# Patient Record
Sex: MALE | Race: ASIAN | NOT HISPANIC OR LATINO
[De-identification: names, ages, dates, MRNs, and addresses within clinical notes are randomized per-mention and may not be internally consistent; named-entity substitution may affect disease eponyms.]

---

## 2018-12-19 PROBLEM — Z00.00 ENCOUNTER FOR PREVENTIVE HEALTH EXAMINATION: Status: ACTIVE | Noted: 2018-12-19

## 2018-12-26 ENCOUNTER — APPOINTMENT (OUTPATIENT)
Dept: INTERNAL MEDICINE | Facility: CLINIC | Age: 78
End: 2018-12-26

## 2018-12-31 ENCOUNTER — APPOINTMENT (OUTPATIENT)
Dept: INTERNAL MEDICINE | Facility: CLINIC | Age: 78
End: 2018-12-31
Payer: MEDICARE

## 2018-12-31 VITALS
OXYGEN SATURATION: 100 % | HEART RATE: 61 BPM | DIASTOLIC BLOOD PRESSURE: 69 MMHG | HEIGHT: 67 IN | SYSTOLIC BLOOD PRESSURE: 129 MMHG | BODY MASS INDEX: 21.35 KG/M2 | WEIGHT: 136 LBS

## 2018-12-31 DIAGNOSIS — Z83.3 FAMILY HISTORY OF DIABETES MELLITUS: ICD-10-CM

## 2018-12-31 DIAGNOSIS — Z78.9 OTHER SPECIFIED HEALTH STATUS: ICD-10-CM

## 2018-12-31 DIAGNOSIS — Z82.49 FAMILY HISTORY OF ISCHEMIC HEART DISEASE AND OTHER DISEASES OF THE CIRCULATORY SYSTEM: ICD-10-CM

## 2018-12-31 PROCEDURE — G0008: CPT

## 2018-12-31 PROCEDURE — 99213 OFFICE O/P EST LOW 20 MIN: CPT | Mod: 25

## 2018-12-31 PROCEDURE — 90662 IIV NO PRSV INCREASED AG IM: CPT

## 2018-12-31 RX ORDER — ASPIRIN 81 MG
81 TABLET, DELAYED RELEASE (ENTERIC COATED) ORAL
Refills: 0 | Status: ACTIVE | COMMUNITY

## 2018-12-31 RX ORDER — AMLODIPINE BESYLATE 5 MG/1
5 TABLET ORAL
Refills: 0 | Status: ACTIVE | COMMUNITY

## 2018-12-31 NOTE — ASSESSMENT
[FreeTextEntry1] : Patient's blood pressure is well-controlled at 115/70 on current regimen. Patient is to continue current therapy. Influenza vaccine high-dose 0.5 cc IM has been given. Patient is advised to return for fasting labs.

## 2018-12-31 NOTE — HISTORY OF PRESENT ILLNESS
[FreeTextEntry1] : Routine followup patient with history of ASHD. [de-identified] : Patient with history of MI and cardiac stent in 1995. Otherwise has been well. He has a history of hypertension on amlodipine 5 and Toprol 50. He's been feeling well without chronic complaints. He wants a flu shot today.

## 2019-01-01 ENCOUNTER — TRANSCRIPTION ENCOUNTER (OUTPATIENT)
Age: 79
End: 2019-01-01

## 2019-01-14 ENCOUNTER — MEDICATION RENEWAL (OUTPATIENT)
Age: 79
End: 2019-01-14

## 2019-02-18 ENCOUNTER — MEDICATION RENEWAL (OUTPATIENT)
Age: 79
End: 2019-02-18

## 2019-04-11 ENCOUNTER — LABORATORY RESULT (OUTPATIENT)
Age: 79
End: 2019-04-11

## 2019-04-11 ENCOUNTER — APPOINTMENT (OUTPATIENT)
Dept: INTERNAL MEDICINE | Facility: CLINIC | Age: 79
End: 2019-04-11
Payer: MEDICARE

## 2019-04-11 DIAGNOSIS — N40.0 BENIGN PROSTATIC HYPERPLASIA WITHOUT LOWER URINARY TRACT SYMPMS: ICD-10-CM

## 2019-04-11 PROCEDURE — 36415 COLL VENOUS BLD VENIPUNCTURE: CPT

## 2019-04-12 LAB
25(OH)D3 SERPL-MCNC: 22.1 NG/ML
ALBUMIN SERPL ELPH-MCNC: 4.5 G/DL
ALP BLD-CCNC: 80 U/L
ALT SERPL-CCNC: 22 U/L
ANION GAP SERPL CALC-SCNC: 15 MMOL/L
AST SERPL-CCNC: 31 U/L
BASOPHILS # BLD AUTO: 0.06 K/UL
BASOPHILS NFR BLD AUTO: 1 %
BILIRUB SERPL-MCNC: 0.2 MG/DL
BUN SERPL-MCNC: 17 MG/DL
CALCIUM SERPL-MCNC: 10 MG/DL
CHLORIDE SERPL-SCNC: 103 MMOL/L
CHOLEST SERPL-MCNC: 134 MG/DL
CHOLEST/HDLC SERPL: 2.9 RATIO
CO2 SERPL-SCNC: 27 MMOL/L
CREAT SERPL-MCNC: 1.82 MG/DL
EOSINOPHIL # BLD AUTO: 0.23 K/UL
EOSINOPHIL NFR BLD AUTO: 4 %
ESTIMATED AVERAGE GLUCOSE: 143 MG/DL
GLUCOSE SERPL-MCNC: 116 MG/DL
HBA1C MFR BLD HPLC: 6.6 %
HCT VFR BLD CALC: 40.8 %
HDLC SERPL-MCNC: 46 MG/DL
HGB BLD-MCNC: 12.7 G/DL
IMM GRANULOCYTES NFR BLD AUTO: 0.2 %
LDLC SERPL CALC-MCNC: 60 MG/DL
LYMPHOCYTES # BLD AUTO: 1.29 K/UL
LYMPHOCYTES NFR BLD AUTO: 22.2 %
MAN DIFF?: NORMAL
MCHC RBC-ENTMCNC: 28.5 PG
MCHC RBC-ENTMCNC: 31.1 GM/DL
MCV RBC AUTO: 91.7 FL
MONOCYTES # BLD AUTO: 0.58 K/UL
MONOCYTES NFR BLD AUTO: 10 %
NEUTROPHILS # BLD AUTO: 3.64 K/UL
NEUTROPHILS NFR BLD AUTO: 62.6 %
PLATELET # BLD AUTO: 240 K/UL
POTASSIUM SERPL-SCNC: 5.1 MMOL/L
PROT SERPL-MCNC: 7.8 G/DL
PSA SERPL-MCNC: 1.52 NG/ML
RBC # BLD: 4.45 M/UL
RBC # FLD: 12.9 %
SODIUM SERPL-SCNC: 145 MMOL/L
TRIGL SERPL-MCNC: 138 MG/DL
TSH SERPL-ACNC: 9.66 UIU/ML
WBC # FLD AUTO: 5.81 K/UL

## 2019-04-17 ENCOUNTER — MEDICATION RENEWAL (OUTPATIENT)
Age: 79
End: 2019-04-17

## 2019-04-17 ENCOUNTER — NON-APPOINTMENT (OUTPATIENT)
Age: 79
End: 2019-04-17

## 2019-04-17 ENCOUNTER — APPOINTMENT (OUTPATIENT)
Dept: INTERNAL MEDICINE | Facility: CLINIC | Age: 79
End: 2019-04-17
Payer: MEDICARE

## 2019-04-17 VITALS
OXYGEN SATURATION: 98 % | BODY MASS INDEX: 21.35 KG/M2 | SYSTOLIC BLOOD PRESSURE: 130 MMHG | HEART RATE: 58 BPM | DIASTOLIC BLOOD PRESSURE: 70 MMHG | HEIGHT: 67 IN | WEIGHT: 136 LBS

## 2019-04-17 DIAGNOSIS — E11.9 TYPE 2 DIABETES MELLITUS W/OUT COMPLICATIONS: ICD-10-CM

## 2019-04-17 DIAGNOSIS — E78.5 HYPERLIPIDEMIA, UNSPECIFIED: ICD-10-CM

## 2019-04-17 DIAGNOSIS — E11.49 TYPE 2 DIABETES MELLITUS WITH OTHER DIABETIC NEUROLOGICAL COMPLICATION: ICD-10-CM

## 2019-04-17 PROCEDURE — 99214 OFFICE O/P EST MOD 30 MIN: CPT | Mod: 25

## 2019-04-17 PROCEDURE — 93000 ELECTROCARDIOGRAM COMPLETE: CPT

## 2019-04-17 NOTE — HISTORY OF PRESENT ILLNESS
[FreeTextEntry1] : Routine physical and 78-year-old male [de-identified] : This is a 78-year-old male with history of myocardial infarction and stent placement in 1995, history of hypertension on amlodipine 5 mg, simvastatin 20 mg and Lopressor 50 b.i.d. He feels well without chronic complaints. He is known to have chronic kidney disease with a creatinine of 1.64 in July of 2017. He is known to have a mildly elevated hemoglobin A1c. His current labs were reviewed reveal a hemoglobin A1c of 6.6, BUN 17 creatinine 1.82. Again the patient feels well no chest pain or shortness of breath. He does eat a lot of carbohydrates.

## 2019-04-17 NOTE — ASSESSMENT
[FreeTextEntry1] : Current blood pressure is 130/70. Patient has an elevated hemoglobin A1c of 6.6. Patient is advised to carefully monitor his diet. He refuses to take blood sugar medication at this time. Patient is also advised of chronic kidney disease he will be seeing his nephrologist shortly. We'll check a urine microalbumin level. EKG reveals an old anteroseptal wall myocardial infarction.

## 2019-04-18 LAB
CREAT SPEC-SCNC: 56 MG/DL
MICROALBUMIN 24H UR DL<=1MG/L-MCNC: 16.6 MG/DL
MICROALBUMIN/CREAT 24H UR-RTO: 294 MG/G

## 2019-09-24 ENCOUNTER — MEDICATION RENEWAL (OUTPATIENT)
Age: 79
End: 2019-09-24

## 2019-12-03 ENCOUNTER — RX RENEWAL (OUTPATIENT)
Age: 79
End: 2019-12-03

## 2020-02-07 ENCOUNTER — RX RENEWAL (OUTPATIENT)
Age: 80
End: 2020-02-07

## 2020-06-12 ENCOUNTER — RX RENEWAL (OUTPATIENT)
Age: 80
End: 2020-06-12

## 2021-10-22 ENCOUNTER — LABORATORY RESULT (OUTPATIENT)
Age: 81
End: 2021-10-22

## 2021-10-22 ENCOUNTER — APPOINTMENT (OUTPATIENT)
Dept: INTERNAL MEDICINE | Facility: CLINIC | Age: 81
End: 2021-10-22
Payer: MEDICARE

## 2021-10-22 VITALS
TEMPERATURE: 96.9 F | HEIGHT: 67 IN | OXYGEN SATURATION: 98 % | BODY MASS INDEX: 20.88 KG/M2 | WEIGHT: 133 LBS | SYSTOLIC BLOOD PRESSURE: 130 MMHG | DIASTOLIC BLOOD PRESSURE: 70 MMHG | HEART RATE: 57 BPM

## 2021-10-22 DIAGNOSIS — I10 ESSENTIAL (PRIMARY) HYPERTENSION: ICD-10-CM

## 2021-10-22 DIAGNOSIS — Z23 ENCOUNTER FOR IMMUNIZATION: ICD-10-CM

## 2021-10-22 DIAGNOSIS — I25.10 ATHEROSCLEROTIC HEART DISEASE OF NATIVE CORONARY ARTERY W/OUT ANGINA PECTORIS: ICD-10-CM

## 2021-10-22 DIAGNOSIS — H81.10 BENIGN PAROXYSMAL VERTIGO, UNSPECIFIED EAR: ICD-10-CM

## 2021-10-22 PROCEDURE — 99213 OFFICE O/P EST LOW 20 MIN: CPT | Mod: 25

## 2021-10-22 PROCEDURE — 90662 IIV NO PRSV INCREASED AG IM: CPT

## 2021-10-22 PROCEDURE — 36415 COLL VENOUS BLD VENIPUNCTURE: CPT

## 2021-10-22 PROCEDURE — G0008: CPT

## 2021-10-22 NOTE — ASSESSMENT
[FreeTextEntry1] : Current blood pressure 150/80 with a pulse of 60 and regular. I suspect vertigo this morning as to the benign positional vertigo. Patient is advised to observe symptoms. He is advised to take amlodipine 5 mg every day as well as increase Lopressor to 50 mg b.i.d. We'll check routine labs. Patient is advised to call me on Monday.

## 2021-10-22 NOTE — HISTORY OF PRESENT ILLNESS
[FreeTextEntry1] :  dizziness this morning with elevated blood pressure. [de-identified] : Patient with history of myocardial infarction with stent placement in 1995. He has a history of hypertension, mild diabetes and hyperlipidemia. He has not seen a doctor since I last saw him 2-1/2 years ago. He was feeling well only taking amlodipine 5 mg 2 times per week. He continues to take Lopressor 50 mg daily, simvastatin and baby aspirin. He was feeling well up until this morning when he awoke and got out of bed. He felt dizzy with spinning feeling. He took his blood pressure was 170/90. He became anxious. He now feels better with no further vertigo. He again has not been taking amlodipine regularly. He has not been taking Lopressor b.i.d. as recommended. He has not had medical followup in 2-1/2 years.

## 2021-10-22 NOTE — PHYSICAL EXAM
[No Acute Distress] : no acute distress [Well Nourished] : well nourished [Well Developed] : well developed [Well-Appearing] : well-appearing [Normal Sclera/Conjunctiva] : normal sclera/conjunctiva [PERRL] : pupils equal round and reactive to light [EOMI] : extraocular movements intact [Normal Outer Ear/Nose] : the outer ears and nose were normal in appearance [Normal Oropharynx] : the oropharynx was normal [No JVD] : no jugular venous distention [No Lymphadenopathy] : no lymphadenopathy [Supple] : supple [Thyroid Normal, No Nodules] : the thyroid was normal and there were no nodules present [No Respiratory Distress] : no respiratory distress  [No Accessory Muscle Use] : no accessory muscle use [Clear to Auscultation] : lungs were clear to auscultation bilaterally [Normal Rate] : normal rate  [Regular Rhythm] : with a regular rhythm [Normal S1, S2] : normal S1 and S2 [No Murmur] : no murmur heard [No Carotid Bruits] : no carotid bruits [No Abdominal Bruit] : a ~M bruit was not heard ~T in the abdomen [No Varicosities] : no varicosities [Pedal Pulses Present] : the pedal pulses are present [No Palpable Aorta] : no palpable aorta [No Extremity Clubbing/Cyanosis] : no extremity clubbing/cyanosis [Soft] : abdomen soft [Non Tender] : non-tender [Non-distended] : non-distended [No Masses] : no abdominal mass palpated [No HSM] : no HSM [Normal Bowel Sounds] : normal bowel sounds [Normal Posterior Cervical Nodes] : no posterior cervical lymphadenopathy [Normal Anterior Cervical Nodes] : no anterior cervical lymphadenopathy [No CVA Tenderness] : no CVA  tenderness [No Spinal Tenderness] : no spinal tenderness [No Joint Swelling] : no joint swelling [Grossly Normal Strength/Tone] : grossly normal strength/tone [No Rash] : no rash [Coordination Grossly Intact] : coordination grossly intact [No Focal Deficits] : no focal deficits [Normal Gait] : normal gait [Normal Affect] : the affect was normal [Normal Insight/Judgement] : insight and judgment were intact [de-identified] : 1+ ankle edema

## 2021-10-28 LAB
25(OH)D3 SERPL-MCNC: 28.1 NG/ML
ALBUMIN SERPL ELPH-MCNC: 4.2 G/DL
ALP BLD-CCNC: 112 U/L
ALT SERPL-CCNC: 16 U/L
ANION GAP SERPL CALC-SCNC: 13 MMOL/L
AST SERPL-CCNC: 21 U/L
BASOPHILS # BLD AUTO: 0.05 K/UL
BASOPHILS NFR BLD AUTO: 0.8 %
BILIRUB SERPL-MCNC: 0.2 MG/DL
BUN SERPL-MCNC: 16 MG/DL
CALCIUM SERPL-MCNC: 9 MG/DL
CHLORIDE SERPL-SCNC: 102 MMOL/L
CHOLEST SERPL-MCNC: 131 MG/DL
CO2 SERPL-SCNC: 22 MMOL/L
CREAT SERPL-MCNC: 1.44 MG/DL
EOSINOPHIL # BLD AUTO: 0.08 K/UL
EOSINOPHIL NFR BLD AUTO: 1.3 %
ESTIMATED AVERAGE GLUCOSE: 137 MG/DL
GLUCOSE SERPL-MCNC: 176 MG/DL
HBA1C MFR BLD HPLC: 6.4 %
HCT VFR BLD CALC: 42.4 %
HDLC SERPL-MCNC: 40 MG/DL
HGB BLD-MCNC: 13.4 G/DL
IMM GRANULOCYTES NFR BLD AUTO: 0.3 %
LDLC SERPL CALC-MCNC: 59 MG/DL
LYMPHOCYTES # BLD AUTO: 0.96 K/UL
LYMPHOCYTES NFR BLD AUTO: 15.5 %
MAN DIFF?: NORMAL
MCHC RBC-ENTMCNC: 27.9 PG
MCHC RBC-ENTMCNC: 31.6 GM/DL
MCV RBC AUTO: 88.1 FL
MONOCYTES # BLD AUTO: 0.43 K/UL
MONOCYTES NFR BLD AUTO: 7 %
NEUTROPHILS # BLD AUTO: 4.64 K/UL
NEUTROPHILS NFR BLD AUTO: 75.1 %
NONHDLC SERPL-MCNC: 90 MG/DL
PLATELET # BLD AUTO: 275 K/UL
POTASSIUM SERPL-SCNC: 5 MMOL/L
PROT SERPL-MCNC: 7.3 G/DL
PSA SERPL-MCNC: 1.05 NG/ML
RBC # BLD: 4.81 M/UL
RBC # FLD: 13.4 %
SODIUM SERPL-SCNC: 137 MMOL/L
TRIGL SERPL-MCNC: 155 MG/DL
TSH SERPL-ACNC: 4.3 UIU/ML
WBC # FLD AUTO: 6.18 K/UL

## 2021-11-12 ENCOUNTER — MED ADMIN CHARGE (OUTPATIENT)
Age: 81
End: 2021-11-12

## 2022-01-09 ENCOUNTER — RESULT CHARGE (OUTPATIENT)
Age: 82
End: 2022-01-09

## 2022-01-10 ENCOUNTER — APPOINTMENT (OUTPATIENT)
Dept: INTERNAL MEDICINE | Facility: CLINIC | Age: 82
End: 2022-01-10
Payer: MEDICARE

## 2022-01-10 ENCOUNTER — NON-APPOINTMENT (OUTPATIENT)
Age: 82
End: 2022-01-10

## 2022-01-10 VITALS
TEMPERATURE: 96.5 F | DIASTOLIC BLOOD PRESSURE: 70 MMHG | BODY MASS INDEX: 21.66 KG/M2 | SYSTOLIC BLOOD PRESSURE: 120 MMHG | HEART RATE: 81 BPM | OXYGEN SATURATION: 97 % | HEIGHT: 67 IN | WEIGHT: 138 LBS

## 2022-01-10 DIAGNOSIS — Z01.818 ENCOUNTER FOR OTHER PREPROCEDURAL EXAMINATION: ICD-10-CM

## 2022-01-10 PROCEDURE — 99214 OFFICE O/P EST MOD 30 MIN: CPT | Mod: 25

## 2022-01-10 PROCEDURE — 93000 ELECTROCARDIOGRAM COMPLETE: CPT

## 2022-01-10 NOTE — HISTORY OF PRESENT ILLNESS
[FreeTextEntry1] : Preoperative medical clearance. [de-identified] : D1-year-[age] male with history of MI and cardiac stent in 1995. He has a history of hypertension, prediabetes stage II chronic kidney disease. He feels well without chest pain or shortness of breath. Medications include felodipine Lopressor and simvastatin baby aspirin. Past surgery gallbladder and hernia surgery. He is now scheduled for cataract surgery on January 19 and again on February 9. He again is asymptomatic without chest pain or shortness of breath

## 2022-01-10 NOTE — ASSESSMENT
[FreeTextEntry1] : Patient with history of coronary artery disease status post stent in 1995. He remains asymptomatic. EKG will be reviewed .  patient is medically cleared to  proceed with planned cataract surgery.

## 2022-01-17 ENCOUNTER — TRANSCRIPTION ENCOUNTER (OUTPATIENT)
Age: 82
End: 2022-01-17

## 2022-01-17 ENCOUNTER — RX RENEWAL (OUTPATIENT)
Age: 82
End: 2022-01-17

## 2022-12-12 ENCOUNTER — TRANSCRIPTION ENCOUNTER (OUTPATIENT)
Age: 82
End: 2022-12-12

## 2022-12-14 ENCOUNTER — NON-APPOINTMENT (OUTPATIENT)
Age: 82
End: 2022-12-14

## 2022-12-15 ENCOUNTER — TRANSCRIPTION ENCOUNTER (OUTPATIENT)
Age: 82
End: 2022-12-15

## 2022-12-20 ENCOUNTER — NON-APPOINTMENT (OUTPATIENT)
Age: 82
End: 2022-12-20

## 2022-12-20 ENCOUNTER — APPOINTMENT (OUTPATIENT)
Dept: HEART AND VASCULAR | Facility: CLINIC | Age: 82
End: 2022-12-20
Payer: MEDICARE

## 2022-12-20 VITALS
SYSTOLIC BLOOD PRESSURE: 105 MMHG | DIASTOLIC BLOOD PRESSURE: 70 MMHG | TEMPERATURE: 98.7 F | WEIGHT: 130 LBS | BODY MASS INDEX: 20.4 KG/M2 | HEIGHT: 67 IN

## 2022-12-20 PROCEDURE — 99204 OFFICE O/P NEW MOD 45 MIN: CPT

## 2022-12-20 PROCEDURE — 99214 OFFICE O/P EST MOD 30 MIN: CPT

## 2022-12-20 PROCEDURE — 93000 ELECTROCARDIOGRAM COMPLETE: CPT

## 2022-12-20 RX ORDER — METOPROLOL TARTRATE 50 MG/1
50 TABLET, FILM COATED ORAL
Qty: 180 | Refills: 3 | Status: DISCONTINUED | COMMUNITY
End: 2022-12-20

## 2022-12-20 RX ORDER — AMLODIPINE BESYLATE 5 MG/1
5 TABLET ORAL DAILY
Qty: 90 | Refills: 3 | Status: DISCONTINUED | COMMUNITY
Start: 2019-01-14 | End: 2022-12-20

## 2022-12-20 RX ORDER — SIMVASTATIN 20 MG/1
20 TABLET, FILM COATED ORAL DAILY
Qty: 90 | Refills: 3 | Status: DISCONTINUED | COMMUNITY
Start: 2021-05-24 | End: 2022-12-20

## 2022-12-20 RX ORDER — AMLODIPINE BESYLATE 5 MG/1
5 TABLET ORAL DAILY
Qty: 90 | Refills: 0 | Status: DISCONTINUED | COMMUNITY
Start: 2019-09-24 | End: 2022-12-20

## 2022-12-20 RX ORDER — SIMVASTATIN 20 MG/1
20 TABLET, FILM COATED ORAL DAILY
Qty: 90 | Refills: 2 | Status: DISCONTINUED | COMMUNITY
End: 2022-12-20

## 2022-12-20 RX ORDER — SIMVASTATIN 20 MG/1
20 TABLET, FILM COATED ORAL DAILY
Qty: 90 | Refills: 3 | Status: DISCONTINUED | COMMUNITY
Start: 2019-02-18 | End: 2022-12-20

## 2022-12-20 RX ORDER — METOPROLOL TARTRATE 50 MG/1
50 TABLET, FILM COATED ORAL
Qty: 180 | Refills: 3 | Status: DISCONTINUED | COMMUNITY
Start: 2019-01-14 | End: 2022-12-20

## 2022-12-20 RX ORDER — SIMVASTATIN 20 MG/1
20 TABLET, FILM COATED ORAL DAILY
Qty: 90 | Refills: 3 | Status: DISCONTINUED | COMMUNITY
Start: 2020-02-07 | End: 2022-12-20

## 2022-12-20 RX ORDER — AMLODIPINE BESYLATE 5 MG/1
5 TABLET ORAL DAILY
Qty: 90 | Refills: 3 | Status: DISCONTINUED | COMMUNITY
Start: 2020-06-12 | End: 2022-12-20

## 2022-12-22 ENCOUNTER — TRANSCRIPTION ENCOUNTER (OUTPATIENT)
Age: 82
End: 2022-12-22

## 2022-12-22 NOTE — DISCUSSION/SUMMARY
[FreeTextEntry1] : 82 y/o M with PMHx of CAD s/p PCI to LAD in 1990s, recent NSTEMI which devolved into STEMI at [facility] on 12/10/2022 (culprit small LPDA s/p PTCA, no MARISELA), HTN, HLD, CKD here for follow up post MI\par \par # CAD\par s/p STEMI\par Has residual LAD, Ramus and OM disease that would benefit from revascularization. \par We had discussed revasc at [facility] while patient was in hospital. Since then patient has sought 2nd opinion from Dr Sherman at [facility] who he knows personally and will be following with him for further intervention\par Continue ASA, Plavix\par Optimize medical therapy and risk factors; high dose statin\par \par # HTN\par BPs at goal\par Continue Amlodipine 5mg daily\par \par # HLD\par Continue Lipitor 80mg daily [EKG obtained to assist in diagnosis and management of assessed problem(s)] : EKG obtained to assist in diagnosis and management of assessed problem(s)

## 2022-12-22 NOTE — HISTORY OF PRESENT ILLNESS
[FreeTextEntry1] : 82 y/o M with PMHx of CAD s/p PCI to LAD in 1990s, recent NSTEMI which devolved into STEMI at [facility] on 12/10/2022 (culprit small LPDA s/p PTCA, no MARISELA), HTN, HLD, CKD here for follow up post MI\par \par Cath 12/10/2022: 3V CAD, Left dominant. LAD with 80% ISR prox / mid, Ramus and OM1 80% stenosis, LPDA 100% s/p PTCA, 80-90% residual stenosis (small vessel)\par \par TTE 12/10/2022: Normal LVEF, mild AI\par \par EKG 12/20/2022: NSR, HR 67, old inferior and anterior infarct\par \par Patient doing well post MI event, denies recurrence of chest pain\par Feeling overall fatigued post hospitalization\par No palpitations, LE edema, PND/ orthopnea reported\par Denies vascular access site issues

## 2022-12-22 NOTE — CARDIOLOGY SUMMARY
[de-identified] : 12/10/2022\par CONCLUSIONS:\par 1. Normal left ventricular size, wall thickness, wall motion, and systolic function.\par 2. Mild (Grade 1) left ventricular diastolic dysfunction.\par 3. Normal right ventricular size and systolic function.\par 4. The left atrium is normal in size.\par 5. The right atrium is normal in size.\par 6. Aorta, from all segments that were visualized, appears normal.\par 7. No pericardial effusion seen.\par 8. Estimated pulmonary artery systolic pressure is 31 mmHg.\par 9. Mild aortic regurgitation.\par 10. Mild calcification of the aortic valve leaflets.\par 11. No aortic stenosis.\par 12. Normal mitral valve structure and function. No mitral stenosis or significant regurgitation.\par 13. The interatrial septum appears intact.\par 14. The pulmonary valve is normal in structure and function, with good leaflet excursion, and without any evidence of pulmonary stenosis or significant regurgitation.\par 15. Tricuspid valve is structurally normal with no stenosis or significant regurgitation.

## 2022-12-30 ENCOUNTER — TRANSCRIPTION ENCOUNTER (OUTPATIENT)
Age: 82
End: 2022-12-30

## 2023-01-05 ENCOUNTER — TRANSCRIPTION ENCOUNTER (OUTPATIENT)
Age: 83
End: 2023-01-05

## 2023-01-11 ENCOUNTER — TRANSCRIPTION ENCOUNTER (OUTPATIENT)
Age: 83
End: 2023-01-11

## 2023-01-11 RX ORDER — AMLODIPINE BESYLATE 5 MG/1
5 TABLET ORAL DAILY
Qty: 90 | Refills: 3 | Status: ACTIVE | COMMUNITY
Start: 2023-01-11 | End: 1900-01-01

## 2023-01-11 RX ORDER — CLOPIDOGREL BISULFATE 75 MG/1
75 TABLET, FILM COATED ORAL DAILY
Qty: 90 | Refills: 3 | Status: ACTIVE | COMMUNITY
Start: 2023-01-11 | End: 1900-01-01

## 2023-01-11 RX ORDER — ATORVASTATIN CALCIUM 80 MG/1
80 TABLET, FILM COATED ORAL DAILY
Qty: 90 | Refills: 3 | Status: ACTIVE | COMMUNITY
Start: 2023-01-11 | End: 1900-01-01

## 2023-01-11 RX ORDER — NITROGLYCERIN 0.4 MG/1
0.4 TABLET SUBLINGUAL
Qty: 28 | Refills: 3 | Status: ACTIVE | COMMUNITY
Start: 2023-01-11 | End: 1900-01-01

## 2023-01-24 ENCOUNTER — APPOINTMENT (OUTPATIENT)
Dept: HEART AND VASCULAR | Facility: CLINIC | Age: 83
End: 2023-01-24
Payer: MEDICARE

## 2023-01-24 ENCOUNTER — NON-APPOINTMENT (OUTPATIENT)
Age: 83
End: 2023-01-24

## 2023-01-24 VITALS
TEMPERATURE: 98.7 F | WEIGHT: 130 LBS | SYSTOLIC BLOOD PRESSURE: 115 MMHG | HEIGHT: 67 IN | HEART RATE: 57 BPM | BODY MASS INDEX: 20.4 KG/M2 | DIASTOLIC BLOOD PRESSURE: 70 MMHG | OXYGEN SATURATION: 96 %

## 2023-01-24 PROCEDURE — 99214 OFFICE O/P EST MOD 30 MIN: CPT

## 2023-01-24 PROCEDURE — 93000 ELECTROCARDIOGRAM COMPLETE: CPT

## 2023-01-30 NOTE — HISTORY OF PRESENT ILLNESS
[FreeTextEntry1] : 83 y/o M with PMHx of CAD s/p PCI to LAD in 1990s, recent NSTEMI which devolved into STEMI at [facility] on 12/10/2022 (culprit small LPDA s/p PTCA, no MARISELA), had staged PCI to LAD and LCx at [facility] [place], HTN, HLD, CKD here for follow up\par \par Cath 12/10/2022: 3V CAD, Left dominant. LAD with 80% ISR prox / mid, Ramus and OM1 80% stenosis, LPDA 100% s/p PTCA, 80-90% residual stenosis (small vessel)\par Cath 12/22/2022 at [facility]: Rota/PCI to prox/mid LAD, PTCA of D1, unsuccessful PCI of dLCx\par \par TTE 12/10/2022: Normal LVEF, mild AI\par \par EKG 12/20/2022: NSR, HR 67, old inferior and anterior infarct\par \par Patient doing well post PCI\par No palpitations, LE edema, PND/ orthopnea reported\par Denies vascular access site issues

## 2023-01-30 NOTE — DISCUSSION/SUMMARY
[FreeTextEntry1] : 83 y/o M with PMHx of CAD s/p PCI to LAD in 1990s, recent NSTEMI which devolved into STEMI at [facility] on 12/10/2022 (culprit small LPDA s/p PTCA, no MARISELA), had staged PCI to LAD and LCx at [facility] [place], HTN, HLD, CKD here for follow up\par \par # CAD\par s/p PCI to LAD, doing well\par Planned for repeat angio / PCI at [facility]\par Continue ASA, Plavix\par Optimize medical therapy and risk factors; high dose statin\par \par # HTN\par BPs at goal\par Continue Amlodipine 5mg daily\par \par # HLD\par Continue Lipitor 80mg daily.  [EKG obtained to assist in diagnosis and management of assessed problem(s)] : EKG obtained to assist in diagnosis and management of assessed problem(s)

## 2023-02-16 ENCOUNTER — NON-APPOINTMENT (OUTPATIENT)
Age: 83
End: 2023-02-16

## 2023-04-05 ENCOUNTER — RX RENEWAL (OUTPATIENT)
Age: 83
End: 2023-04-05

## 2023-04-05 RX ORDER — METOPROLOL TARTRATE 50 MG/1
50 TABLET, FILM COATED ORAL
Qty: 180 | Refills: 0 | Status: ACTIVE | COMMUNITY
Start: 2021-01-05 | End: 1900-01-01

## 2023-04-05 RX ORDER — AMLODIPINE BESYLATE 5 MG/1
5 TABLET ORAL DAILY
Qty: 90 | Refills: 0 | Status: ACTIVE | COMMUNITY
Start: 2021-10-27 | End: 1900-01-01

## 2023-11-13 ENCOUNTER — APPOINTMENT (OUTPATIENT)
Dept: FAMILY MEDICINE | Facility: CLINIC | Age: 83
End: 2023-11-13

## 2023-11-13 DIAGNOSIS — R79.89 OTHER SPECIFIED ABNORMAL FINDINGS OF BLOOD CHEMISTRY: ICD-10-CM
